# Patient Record
Sex: FEMALE | Race: ASIAN | NOT HISPANIC OR LATINO | Employment: FULL TIME | URBAN - METROPOLITAN AREA
[De-identification: names, ages, dates, MRNs, and addresses within clinical notes are randomized per-mention and may not be internally consistent; named-entity substitution may affect disease eponyms.]

---

## 2021-03-20 ENCOUNTER — IMMUNIZATION (OUTPATIENT)
Dept: PRIMARY CARE CLINIC | Facility: CLINIC | Age: 56
End: 2021-03-20

## 2021-03-20 DIAGNOSIS — Z23 NEED FOR VACCINATION: Primary | ICD-10-CM

## 2021-03-20 PROCEDURE — 0001A PR IMMUNIZ ADMIN, SARS-COV-2 COVID-19 VACC, 30MCG/0.3ML, 1ST DOSE: ICD-10-PCS | Mod: CV19,S$GLB,, | Performed by: INTERNAL MEDICINE

## 2021-03-20 PROCEDURE — 0001A PR IMMUNIZ ADMIN, SARS-COV-2 COVID-19 VACC, 30MCG/0.3ML, 1ST DOSE: CPT | Mod: CV19,S$GLB,, | Performed by: INTERNAL MEDICINE

## 2021-03-20 PROCEDURE — 91300 PR SARS-COV- 2 COVID-19 VACCINE, NO PRSV, 30MCG/0.3ML, IM: CPT | Mod: S$GLB,,, | Performed by: INTERNAL MEDICINE

## 2021-03-20 PROCEDURE — 91300 PR SARS-COV- 2 COVID-19 VACCINE, NO PRSV, 30MCG/0.3ML, IM: ICD-10-PCS | Mod: S$GLB,,, | Performed by: INTERNAL MEDICINE

## 2021-03-20 RX ADMIN — Medication 0.3 ML: at 01:03

## 2021-04-11 ENCOUNTER — IMMUNIZATION (OUTPATIENT)
Dept: PRIMARY CARE CLINIC | Facility: CLINIC | Age: 56
End: 2021-04-11

## 2021-04-11 DIAGNOSIS — Z23 NEED FOR VACCINATION: Primary | ICD-10-CM

## 2021-04-11 PROCEDURE — 0002A PR IMMUNIZ ADMIN, SARS-COV-2 COVID-19 VACC, 30MCG/0.3ML, 2ND DOSE: ICD-10-PCS | Mod: CV19,S$GLB,, | Performed by: INTERNAL MEDICINE

## 2021-04-11 PROCEDURE — 0002A PR IMMUNIZ ADMIN, SARS-COV-2 COVID-19 VACC, 30MCG/0.3ML, 2ND DOSE: CPT | Mod: CV19,S$GLB,, | Performed by: INTERNAL MEDICINE

## 2021-04-11 PROCEDURE — 91300 PR SARS-COV- 2 COVID-19 VACCINE, NO PRSV, 30MCG/0.3ML, IM: CPT | Mod: S$GLB,,, | Performed by: INTERNAL MEDICINE

## 2021-04-11 PROCEDURE — 91300 PR SARS-COV- 2 COVID-19 VACCINE, NO PRSV, 30MCG/0.3ML, IM: ICD-10-PCS | Mod: S$GLB,,, | Performed by: INTERNAL MEDICINE

## 2021-04-11 RX ADMIN — Medication 0.3 ML: at 10:04

## 2021-10-30 ENCOUNTER — IMMUNIZATION (OUTPATIENT)
Dept: PRIMARY CARE CLINIC | Facility: CLINIC | Age: 56
End: 2021-10-30

## 2021-10-30 DIAGNOSIS — Z23 NEED FOR VACCINATION: Primary | ICD-10-CM

## 2021-10-30 PROCEDURE — 0001A COVID-19, MRNA, LNP-S, PF, 30 MCG/0.3 ML DOSE VACCINE: CPT | Mod: CV19,PBBFAC

## 2022-09-08 ENCOUNTER — OFFICE VISIT (OUTPATIENT)
Dept: URGENT CARE | Facility: CLINIC | Age: 57
End: 2022-09-08

## 2022-09-08 VITALS
TEMPERATURE: 99 F | HEIGHT: 63 IN | OXYGEN SATURATION: 98 % | BODY MASS INDEX: 21.26 KG/M2 | SYSTOLIC BLOOD PRESSURE: 133 MMHG | WEIGHT: 120 LBS | DIASTOLIC BLOOD PRESSURE: 83 MMHG | RESPIRATION RATE: 16 BRPM | HEART RATE: 118 BPM

## 2022-09-08 DIAGNOSIS — J02.9 SORE THROAT: Primary | ICD-10-CM

## 2022-09-08 DIAGNOSIS — Z86.39 HX OF THYROID DISEASE: ICD-10-CM

## 2022-09-08 LAB
CTP QC/QA: YES
FLUAV AG NPH QL: NEGATIVE
FLUBV AG NPH QL: NEGATIVE
HETEROPH AB SER QL: NEGATIVE
MOLECULAR STREP A: NEGATIVE
SARS-COV-2 RDRP RESP QL NAA+PROBE: NEGATIVE

## 2022-09-08 PROCEDURE — 87804 INFLUENZA ASSAY W/OPTIC: CPT | Mod: 59,QW,TIER,S$GLB | Performed by: FAMILY MEDICINE

## 2022-09-08 PROCEDURE — 99203 OFFICE O/P NEW LOW 30 MIN: CPT | Mod: TIER,25,S$GLB, | Performed by: FAMILY MEDICINE

## 2022-09-08 PROCEDURE — 86308 POCT INFECTIOUS MONONUCLEOSIS: ICD-10-PCS | Mod: QW,TIER,S$GLB, | Performed by: FAMILY MEDICINE

## 2022-09-08 PROCEDURE — 99203 PR OFFICE/OUTPT VISIT, NEW, LEVL III, 30-44 MIN: ICD-10-PCS | Mod: TIER,25,S$GLB, | Performed by: FAMILY MEDICINE

## 2022-09-08 PROCEDURE — U0002 COVID-19 LAB TEST NON-CDC: HCPCS | Mod: QW,TIER,S$GLB, | Performed by: FAMILY MEDICINE

## 2022-09-08 PROCEDURE — U0002: ICD-10-PCS | Mod: QW,TIER,S$GLB, | Performed by: FAMILY MEDICINE

## 2022-09-08 PROCEDURE — 86308 HETEROPHILE ANTIBODY SCREEN: CPT | Mod: QW,TIER,S$GLB, | Performed by: FAMILY MEDICINE

## 2022-09-08 PROCEDURE — 87651 POCT STREP A MOLECULAR: ICD-10-PCS | Mod: QW,TIER,S$GLB, | Performed by: FAMILY MEDICINE

## 2022-09-08 PROCEDURE — 87804 POCT INFLUENZA A/B: ICD-10-PCS | Mod: 59,QW,TIER,S$GLB | Performed by: FAMILY MEDICINE

## 2022-09-08 PROCEDURE — 87651 STREP A DNA AMP PROBE: CPT | Mod: QW,TIER,S$GLB, | Performed by: FAMILY MEDICINE

## 2022-09-08 RX ORDER — FERROUS SULFATE, DRIED 160(50) MG
1 TABLET, EXTENDED RELEASE ORAL 2 TIMES DAILY WITH MEALS
COMMUNITY

## 2022-09-08 RX ORDER — MAGNESIUM 30 MG
TABLET ORAL ONCE
COMMUNITY

## 2022-09-08 RX ORDER — AMOXICILLIN 500 MG
CAPSULE ORAL DAILY
COMMUNITY

## 2022-09-08 NOTE — PATIENT INSTRUCTIONS
[facility]    [address] [place] [address], [address]  CHRISTINE Levi [address]  [phone] Phone    [phone] Fax    [facility]    Hours of Operation    Monday-Thursday: 8 a.m. to 5 p.m.  Friday: 8 a.m. to 1 p.m.  Saturday-Sunday: Closed  Services    Pediatrics, General Medicine, Medicaid Enrollment, Medicare Enrollment Assistance

## 2022-09-08 NOTE — PROGRESS NOTES
"Subjective:       Patient ID: Jennifer Castillo is a 57 y.o. female.    Vitals:  height is 5' 3" (1.6 m) and weight is 54.4 kg (120 lb). Her oral temperature is 98.6 °F (37 °C). Her blood pressure is 133/83 and her pulse is 118 (abnormal). Her respiration is 16 and oxygen saturation is 98%.     Chief Complaint: Sore Throat (Not covid. But have flu symptoms for at least 2 weeks. Bad sore throat. - Entered by patient)    This is a 57 y.o. female who presents today with a chief complaint of sore throat, cough x 2 weeks; temperature of 100.0 at home; 7 days ago had a temperature of 104 that was relieved with Nyquil. Pt has been using Dayquil and Nyquil with relief. Pt has taken 4 at-home COVID tests in the past 2 weeks which indicate negative. Reports hx of thyroid "infection" per patient in [place] and inquiring as to possibility of etiology      Sore Throat   This is a new problem. The current episode started 1 to 4 weeks ago. The problem has been waxing and waning. The pain is at a severity of 4/10. The pain is moderate. Associated symptoms include coughing and headaches. Pertinent negatives include no abdominal pain or congestion. She has had no exposure to strep or mono.     HENT:  Positive for sore throat. Negative for congestion.    Respiratory:  Positive for cough.    Gastrointestinal:  Negative for abdominal pain.   Neurological:  Positive for headaches.     Objective:      Physical Exam   Constitutional: normal  HENT:   Head: Normocephalic and atraumatic.   Nose: Nose normal.   Mouth/Throat: Mucous membranes are moist. Posterior oropharyngeal erythema present. No oropharyngeal exudate.   Eyes: Pupils are equal, round, and reactive to light. Extraocular movement intact   Cardiovascular: Normal rate, regular rhythm, normal heart sounds and normal pulses.   Abdominal: Normal appearance.   Lymphadenopathy:     She has cervical adenopathy (tender).   Neurological: She is alert.   Nursing note and vitals " reviewed.      Results for orders placed or performed in visit on 09/08/22   POCT COVID-19 Rapid Screening   Result Value Ref Range    POC Rapid COVID Negative Negative     Acceptable Yes    POCT Influenza A/B   Result Value Ref Range    Rapid Influenza A Ag Negative Negative    Rapid Influenza B Ag Negative Negative     Acceptable Yes    POCT Strep A, Molecular   Result Value Ref Range    Molecular Strep A, POC Negative Negative     Acceptable Yes    POCT Infectious mononucleosis antibody   Result Value Ref Range    Monospot Negative Negative     Acceptable Yes       Assessment:       1. Sore throat    2. Hx of thyroid disease          Plan:         Sore throat  -     POCT COVID-19 Rapid Screening  -     POCT Influenza A/B  -     POCT Strep A, Molecular  -     POCT Infectious mononucleosis antibody  -     diphenhydrAMINE-aluminum-magnesium hydroxide-simethicone-LIDOcaine HCl 2%; Swish and spit 10 mLs every 4 (four) hours as needed (sore throat).  Dispense: 120 each; Refill: 0    Hx of thyroid disease     Referred to primary care for re-evaluation. Information given

## 2022-09-13 ENCOUNTER — TELEPHONE (OUTPATIENT)
Dept: INTERNAL MEDICINE | Facility: CLINIC | Age: 57
End: 2022-09-13

## 2022-09-13 ENCOUNTER — OFFICE VISIT (OUTPATIENT)
Dept: INTERNAL MEDICINE | Facility: CLINIC | Age: 57
End: 2022-09-13

## 2022-09-13 VITALS
BODY MASS INDEX: 20.98 KG/M2 | HEIGHT: 63 IN | OXYGEN SATURATION: 99 % | DIASTOLIC BLOOD PRESSURE: 82 MMHG | HEART RATE: 129 BPM | WEIGHT: 118.38 LBS | SYSTOLIC BLOOD PRESSURE: 130 MMHG

## 2022-09-13 DIAGNOSIS — E05.90 HYPERTHYROIDISM: Primary | ICD-10-CM

## 2022-09-13 DIAGNOSIS — D50.9 IRON DEFICIENCY ANEMIA, UNSPECIFIED IRON DEFICIENCY ANEMIA TYPE: ICD-10-CM

## 2022-09-13 DIAGNOSIS — E07.9 THYROID DISEASE: ICD-10-CM

## 2022-09-13 DIAGNOSIS — R00.0 TACHYCARDIA: ICD-10-CM

## 2022-09-13 DIAGNOSIS — E05.90 HYPERTHYROIDISM: ICD-10-CM

## 2022-09-13 PROCEDURE — 93010 ELECTROCARDIOGRAM REPORT: CPT | Mod: S$PBB,,, | Performed by: INTERNAL MEDICINE

## 2022-09-13 PROCEDURE — 99204 PR OFFICE/OUTPT VISIT, NEW, LEVL IV, 45-59 MIN: ICD-10-PCS | Mod: S$PBB,,, | Performed by: NURSE PRACTITIONER

## 2022-09-13 PROCEDURE — 99999 PR PBB SHADOW E&M-EST. PATIENT-LVL IV: CPT | Mod: PBBFAC,,, | Performed by: NURSE PRACTITIONER

## 2022-09-13 PROCEDURE — 99214 OFFICE O/P EST MOD 30 MIN: CPT | Mod: PBBFAC | Performed by: NURSE PRACTITIONER

## 2022-09-13 PROCEDURE — 99204 OFFICE O/P NEW MOD 45 MIN: CPT | Mod: S$PBB,,, | Performed by: NURSE PRACTITIONER

## 2022-09-13 PROCEDURE — 93010 EKG 12-LEAD: ICD-10-PCS | Mod: S$PBB,,, | Performed by: INTERNAL MEDICINE

## 2022-09-13 PROCEDURE — 93005 ELECTROCARDIOGRAM TRACING: CPT | Mod: PBBFAC | Performed by: INTERNAL MEDICINE

## 2022-09-13 PROCEDURE — 99999 PR PBB SHADOW E&M-EST. PATIENT-LVL IV: ICD-10-PCS | Mod: PBBFAC,,, | Performed by: NURSE PRACTITIONER

## 2022-09-13 RX ORDER — METHIMAZOLE 10 MG/1
10 TABLET ORAL 2 TIMES DAILY
Qty: 60 TABLET | Refills: 11 | Status: SHIPPED | OUTPATIENT
Start: 2022-09-13 | End: 2022-11-24 | Stop reason: ALTCHOICE

## 2022-09-13 RX ORDER — METHIMAZOLE 5 MG/1
5 TABLET ORAL 3 TIMES DAILY
Qty: 90 TABLET | Refills: 11 | Status: SHIPPED | OUTPATIENT
Start: 2022-09-13 | End: 2022-09-13 | Stop reason: SDUPTHER

## 2022-09-13 RX ORDER — PROPRANOLOL HYDROCHLORIDE 20 MG/1
20 TABLET ORAL 3 TIMES DAILY
Qty: 90 TABLET | Refills: 11 | Status: SHIPPED | OUTPATIENT
Start: 2022-09-13 | End: 2023-09-13

## 2022-09-13 NOTE — TELEPHONE ENCOUNTER
----- Message from Dannie Saleem MD sent at 9/13/2022  2:30 PM CDT -----  Oh yes I see now.  FT4 is significantly elevated.  I would recommend Methimazole 10mg BID for now until we can get her in to see us.       Thank you   ----- Message -----  From: Maxine Fletcher NP  Sent: 9/13/2022   1:45 PM CDT  To: Dannie Saleem MD    She had labs done at [facility], I wrote the values in my HPI   ----- Message -----  From: Dannie Saleem MD  Sent: 9/13/2022   1:35 PM CDT  To: Maxine Fletcher NP, #    Good afternoon Ms. Baca,     Thank you for reaching out.  I do not see any thyroid function testing recent.  Methimazole can be given BID for better compliance.  Depends on the FT4/ T3 and symptoms to determine dose.  For now Methimazole 5mg TID and Propranolol should be ok if indeed his TFTs are consistent with hyperthyroidism.  DO you know what was his TSH, FT4 and T3 levels?       We will schedule a new patient visit the earliest available.       Dairyal,     Can we please schedule New patient visit with me or any available provider.       Thank you       ----- Message -----  From: Maxine Fletcher NP  Sent: 9/13/2022   1:28 PM CDT  To: Dannie Saleem MD    Good afternoon,   I saw Ms Castillo in clinic today and she is recently dx with hyperthyroidism. I started her on methimazole 5mg tid and propranolol 20mg TID. She is scheduled for thyroid u/s tomorrow. Any other advice would be greatly appreciated. I dont usually treat this in primary care but with her recent symptoms and without insurance I was trying to keep her out of the ED   Maxine

## 2022-09-13 NOTE — PROGRESS NOTES
Internal Medicine Annual Exam       CHIEF COMPLAINT     The patient, Jennifer Castillo, who is a 57 y.o. female presents for an annual exam.    HPI     Per pt and daughter - pt is uninsured but has labs from [facility] from 9/2021 and 9/2022    Here with c/o illness starting 3 weeks ago. Started with fatigue and sore throat. Daughter was ill the week before. Since that time pt started with fever 1 week into illness - Tmax 104 resolved with tylenol. Was treating with Nyquil. Felt better but symptoms returned.   +cough  +night sweats   +chills.   Continues to have low grade fevers and sore throat and radiating up to the right ear and headaches and body aches   +throat feels swollen has moved from right to left     TSH 0.01L  T4 free 8.1 H  T4 total 18.3 H  T3 uptake 44H    HM-  [facility]- 10/2021  [facility]- needs   PAP-3/2022      Past Medical History:  Past Medical History:   Diagnosis Date    Thyroid disease        History reviewed. No pertinent surgical history.     Family History   Problem Relation Age of Onset    Stomach cancer Mother     Hypertension Father     No Known Problems Brother     No Known Problems Brother     No Known Problems Brother     Liver cancer Maternal Aunt         Social History     Socioeconomic History    Marital status:    Tobacco Use    Smoking status: Never     Passive exposure: Never    Smokeless tobacco: Never   Substance and Sexual Activity    Alcohol use: Yes     Alcohol/week: 1.0 standard drink     Types: 1 Cans of beer per week     Comment: socailly    Drug use: Never    Sexual activity: Not Currently     Partners: Male     Birth control/protection: Post-menopausal        Social History     Tobacco Use   Smoking Status Never    Passive exposure: Never   Smokeless Tobacco Never        Allergies as of 09/13/2022    (No Known Allergies)          Home Medications:  Prior to Admission medications    Medication Sig Start Date End Date Taking? Authorizing Provider   calcium-vitamin D3 (OS-VERNA 500 + D3)  500 mg-5 mcg (200 unit) per tablet Take 1 tablet by mouth 2 (two) times daily with meals.    Historical Provider   diphenhydrAMINE-aluminum-magnesium hydroxide-simethicone-LIDOcaine HCl 2% Swish and spit 10 mLs every 4 (four) hours as needed (sore throat).  Patient not taking: Reported on 9/13/2022 9/8/22   Leonard Gagnon MD   magnesium 30 mg Tab Take by mouth once.    Historical Provider   omega-3 fatty acids/fish oil (FISH OIL-OMEGA-3 FATTY ACIDS) 300-1,000 mg capsule Take by mouth once daily.    Historical Provider       Review of Systems:  Review of Systems   Constitutional:  Positive for chills, diaphoresis, fatigue and fever (last fever 1 week ago - has normal temperature in office without medication).   HENT:  Positive for ear pain and sore throat. Negative for congestion.    Respiratory:  Positive for cough and chest tightness. Negative for shortness of breath and wheezing.    Cardiovascular:  Negative for chest pain and palpitations.   Gastrointestinal:  Negative for abdominal pain, constipation, diarrhea, nausea and vomiting (when using magic mouthwash).   Endocrine: Positive for cold intolerance and heat intolerance.   Genitourinary: Negative.    Musculoskeletal:  Positive for arthralgias and myalgias.   Skin:  Negative for rash.   Neurological:  Negative for dizziness, light-headedness and headaches.     Health Maintainence:   Immunizations:  Health Maintenance         Date Due Completion Date    Hepatitis C Screening Never done ---    Cervical Cancer Screening Never done ---    Lipid Panel Never done ---    HIV Screening Never done ---    TETANUS VACCINE Never done ---    Mammogram Never done ---    Colorectal Cancer Screening Never done ---    Shingles Vaccine (1 of 2) Never done ---    COVID-19 Vaccine (4 - Booster for Pfizer series) 02/28/2022 10/30/2021    Influenza Vaccine (1) 09/01/2022 10/26/2021             PHYSICAL EXAM     /82 (BP Location: Left arm, Patient Position: Sitting, BP  "Method: Medium (Manual))   Pulse (!) 129   Ht 5' 3" (1.6 m)   Wt 53.7 kg (118 lb 6.2 oz)   LMP  (LMP Unknown)   SpO2 99%   BMI 20.97 kg/m²  Body mass index is 20.97 kg/m².    Physical Exam  Vitals reviewed.   Constitutional:       Appearance: She is well-developed.   HENT:      Head: Normocephalic.      Right Ear: External ear normal.      Left Ear: External ear normal.      Nose: Nose normal.      Mouth/Throat:      Pharynx: No oropharyngeal exudate.   Eyes:      Pupils: Pupils are equal, round, and reactive to light.   Neck:      Thyroid: Thyromegaly present.      Vascular: No JVD.      Trachea: No tracheal deviation.   Cardiovascular:      Rate and Rhythm: Regular rhythm. Tachycardia present.      Heart sounds: Normal heart sounds. No murmur heard.    No friction rub. No gallop.   Pulmonary:      Effort: Pulmonary effort is normal. No respiratory distress.      Breath sounds: Normal breath sounds. No wheezing or rales.   Abdominal:      General: Bowel sounds are normal. There is no distension.      Palpations: Abdomen is soft.      Tenderness: There is no abdominal tenderness.   Musculoskeletal:         General: No tenderness. Normal range of motion.      Cervical back: Neck supple.   Lymphadenopathy:      Cervical: No cervical adenopathy.   Skin:     General: Skin is warm and dry.      Findings: No rash.   Neurological:      Mental Status: She is alert and oriented to person, place, and time.   Psychiatric:         Behavior: Behavior normal.       LABS     No results found for: LABA1C, HGBA1C  CMP  No results found for: NA, K, CL, CO2, GLU, BUN, CREATININE, CALCIUM, PROT, ALBUMIN, BILITOT, ALKPHOS, AST, ALT, ANIONGAP, ESTGFRAFRICA, EGFRNONAA  No results found for: WBC, HGB, HCT, MCV, PLT  No results found for: CHOL  No results found for: HDL  No results found for: LDLCALC  No results found for: TRIG  No results found for: CHOLHDL  No results found for: TSH, E6TADHO, [id number], THYROIDAB    ASSESSMENT/PLAN "     Jennifer Castillo is a 57 y.o. female    Hyperthyroidism- new onset. reviewed recent labs. Will start methimazole and propranolol. EKG in office ST rate 112. Will send for STAT endo referral and thyroid u/s. Discussed ED precautions with daughter who states understanding. Will f/u in 2 weeks with me if unable to get in with endo.   -     IN OFFICE EKG 12-LEAD (to Muse)  -     US Soft Tissue Head Neck Thyroid; Future; Expected date: 09/13/2022  -     propranoloL (INDERAL) 20 MG tablet; Take 1 tablet (20 mg total) by mouth 3 (three) times daily.  Dispense: 90 tablet; Refill: 11  -     methIMAzole (TAPAZOLE) 5 MG Tab; Take 1 tablet (5 mg total) by mouth 3 (three) times daily.  Dispense: 90 tablet; Refill: 11  -     Ambulatory referral/consult to Endocrinology; Future; Expected date: 09/20/2022    Thyroid disease- new onset. reviewed recent labs. Will start methimazole and propranolol. EKG in office ST rate 112. Will send for STAT endo referral and thyroid u/s. Discussed ED precautions with daughter who states understanding. Will f/u in 2 weeks with me if unable to get in with endo.   -     IN OFFICE EKG 12-LEAD (to Muse)  -     US Soft Tissue Head Neck Thyroid; Future; Expected date: 09/13/2022  -     propranoloL (INDERAL) 20 MG tablet; Take 1 tablet (20 mg total) by mouth 3 (three) times daily.  Dispense: 90 tablet; Refill: 11  -     methIMAzole (TAPAZOLE) 5 MG Tab; Take 1 tablet (5 mg total) by mouth 3 (three) times daily.  Dispense: 90 tablet; Refill: 11  -     Ambulatory referral/consult to Endocrinology; Future; Expected date: 09/20/2022    Iron deficiency anemia, unspecified iron deficiency anemia type- will check stool sample. If positive send for c-scope.   -     Occult blood x 1, stool; Future; Expected date: 09/13/2022  -     IN OFFICE EKG 12-LEAD (to Muse)  -     US Soft Tissue Head Neck Thyroid; Future; Expected date: 09/13/2022  -     propranoloL (INDERAL) 20 MG tablet; Take 1 tablet (20 mg total) by mouth 3  (three) times daily.  Dispense: 90 tablet; Refill: 11  -     methIMAzole (TAPAZOLE) 5 MG Tab; Take 1 tablet (5 mg total) by mouth 3 (three) times daily.  Dispense: 90 tablet; Refill: 11    Tachycardia- new onset. reviewed recent labs. Will start methimazole and propranolol. EKG in office ST rate 112. Will send for STAT endo referral and thyroid u/s. Discussed ED precautions with daughter who states understanding. Will f/u in 2 weeks with me if unable to get in with endo.   -     IN OFFICE EKG 12-LEAD (to Muse)  -     US Soft Tissue Head Neck Thyroid; Future; Expected date: 09/13/2022  -     propranoloL (INDERAL) 20 MG tablet; Take 1 tablet (20 mg total) by mouth 3 (three) times daily.  Dispense: 90 tablet; Refill: 11  -     methIMAzole (TAPAZOLE) 5 MG Tab; Take 1 tablet (5 mg total) by mouth 3 (three) times daily.  Dispense: 90 tablet; Refill: 11  -     Ambulatory referral/consult to Endocrinology; Future; Expected date: 09/20/2022     Follow up with PCP in 2 weeks for follow up     Maxine CLANCY, APRN, FNP-c   Department of Internal Medicine - [facility]  12:04 PM

## 2022-09-13 NOTE — Clinical Note
Good afternoon,  I saw Ms Castillo in clinic today and she is recently dx with hyperthyroidism. I started her on methimazole 5mg tid and propranolol 20mg TID. She is scheduled for thyroid u/s tomorrow. Any other advice would be greatly appreciated. I dont usually treat this in primary care but with her recent symptoms and without insurance I was trying to keep her out of the ED  Maxine

## 2022-09-14 ENCOUNTER — HOSPITAL ENCOUNTER (OUTPATIENT)
Dept: RADIOLOGY | Facility: HOSPITAL | Age: 57
Discharge: HOME | End: 2022-09-14
Attending: NURSE PRACTITIONER

## 2022-09-14 DIAGNOSIS — R00.0 TACHYCARDIA: ICD-10-CM

## 2022-09-14 DIAGNOSIS — E07.9 THYROID DISEASE: ICD-10-CM

## 2022-09-14 DIAGNOSIS — D50.9 IRON DEFICIENCY ANEMIA, UNSPECIFIED IRON DEFICIENCY ANEMIA TYPE: ICD-10-CM

## 2022-09-14 DIAGNOSIS — E05.90 HYPERTHYROIDISM: ICD-10-CM

## 2022-09-14 PROCEDURE — 76536 US EXAM OF HEAD AND NECK: CPT | Mod: TC

## 2022-09-14 PROCEDURE — 76536 US SOFT TISSUE HEAD NECK THYROID: ICD-10-PCS | Mod: 26,,, | Performed by: RADIOLOGY

## 2022-09-14 PROCEDURE — 76536 US EXAM OF HEAD AND NECK: CPT | Mod: 26,,, | Performed by: RADIOLOGY

## 2022-09-19 ENCOUNTER — LAB VISIT (OUTPATIENT)
Dept: LAB | Facility: HOSPITAL | Age: 57
End: 2022-09-19
Attending: GENERAL ACUTE CARE HOSPITAL

## 2022-09-19 ENCOUNTER — OFFICE VISIT (OUTPATIENT)
Dept: ENDOCRINOLOGY | Facility: CLINIC | Age: 57
End: 2022-09-19

## 2022-09-19 ENCOUNTER — PATIENT MESSAGE (OUTPATIENT)
Dept: ENDOCRINOLOGY | Facility: CLINIC | Age: 57
End: 2022-09-19

## 2022-09-19 VITALS
WEIGHT: 114 LBS | SYSTOLIC BLOOD PRESSURE: 110 MMHG | BODY MASS INDEX: 20.2 KG/M2 | DIASTOLIC BLOOD PRESSURE: 60 MMHG | HEART RATE: 97 BPM | OXYGEN SATURATION: 93 % | HEIGHT: 63 IN

## 2022-09-19 DIAGNOSIS — E05.90 HYPERTHYROIDISM: ICD-10-CM

## 2022-09-19 DIAGNOSIS — K21.9 GASTROESOPHAGEAL REFLUX DISEASE, UNSPECIFIED WHETHER ESOPHAGITIS PRESENT: ICD-10-CM

## 2022-09-19 DIAGNOSIS — E07.9 THYROID DISEASE: ICD-10-CM

## 2022-09-19 DIAGNOSIS — R00.0 TACHYCARDIA: ICD-10-CM

## 2022-09-19 DIAGNOSIS — E05.90 HYPERTHYROIDISM: Primary | ICD-10-CM

## 2022-09-19 LAB — CRP SERPL-MCNC: 84.4 MG/L (ref 0–8.2)

## 2022-09-19 PROCEDURE — 86140 C-REACTIVE PROTEIN: CPT | Performed by: GENERAL ACUTE CARE HOSPITAL

## 2022-09-19 PROCEDURE — 36415 COLL VENOUS BLD VENIPUNCTURE: CPT | Performed by: GENERAL ACUTE CARE HOSPITAL

## 2022-09-19 PROCEDURE — 99999 PR PBB SHADOW E&M-EST. PATIENT-LVL V: ICD-10-PCS | Mod: PBBFAC,,, | Performed by: GENERAL ACUTE CARE HOSPITAL

## 2022-09-19 PROCEDURE — 99999 PR PBB SHADOW E&M-EST. PATIENT-LVL V: CPT | Mod: PBBFAC,,, | Performed by: GENERAL ACUTE CARE HOSPITAL

## 2022-09-19 PROCEDURE — 99215 OFFICE O/P EST HI 40 MIN: CPT | Mod: PBBFAC | Performed by: GENERAL ACUTE CARE HOSPITAL

## 2022-09-19 PROCEDURE — 99204 OFFICE O/P NEW MOD 45 MIN: CPT | Mod: S$PBB,,, | Performed by: GENERAL ACUTE CARE HOSPITAL

## 2022-09-19 PROCEDURE — 99204 PR OFFICE/OUTPT VISIT, NEW, LEVL IV, 45-59 MIN: ICD-10-PCS | Mod: S$PBB,,, | Performed by: GENERAL ACUTE CARE HOSPITAL

## 2022-09-19 RX ORDER — PANTOPRAZOLE SODIUM 20 MG/1
20 TABLET, DELAYED RELEASE ORAL DAILY
Qty: 30 TABLET | Refills: 1 | Status: SHIPPED | OUTPATIENT
Start: 2022-09-19 | End: 2023-09-19

## 2022-09-19 NOTE — PATIENT INSTRUCTIONS
Due to your thyroid being over active which is very likely to be inflammation of your thyroid from prior viral illness, I will recommend the following.       We will do blood work today to investigate why your thyroid is over active.     For now continue taking Methimazole 10mg twice a day.     For now continue with  Propranolol 20mg three times a day (as needed for palpitations or tremors)     You can start taking Ibuprofen 400mg every 8 hrs for pain.         You can start taking pantoprazole 20mg daily for acid reflux.      Once I have results of the blood work, I will contact you with next steps to follow.     Follow up in 4 months.     Dr. Berg

## 2022-09-19 NOTE — PROGRESS NOTES
Subjective:      Patient ID: Jennifer Castillo is a 57 y.o.    Chief Complaint:  Hyperthyroidism; Initial visit     There is no problem list on file for this patient.     History of Present Illness  58 YO Male that presents to the endocrine clinic for initial evaluation of Hyperthyroidism. Pt presented to PCP on 9/13/2022 c/o fever sore throat and neck pain.  During evaluation patient had Low TSH with elevated FT4 and patient was started on methimazole and propranolol.  Pt today reports that she was illl with sore throat, fatigue (1 month ago x 1 week).   On week 2 she started having fevers x 1 week, night  sweats, worsening sore throat and having neck swelling and tenderness.  Also having palpitations, diarrhea, heat intolerance, tremors and weight loss.  Denies eye symptoms or thyroid compressive symptoms.  Denies FH of Thyroid disease.         With regards to Hyperthyroidism and Thyroid Nodule:       Out side labs not available for my review.  Per ref provider note:        TSH 0.01L  T4 free 8.1 H  T4 total 18.3 H  T3 uptake 44H      Pt  was started on Methimazole 10mg BID and propranolol 20mg TID      Prior to initiation of treatment patient was having Fever, Cough, Sore Throat and neck pain  (Concerning for sub acute thyroiditis)       Thyroid US 9/2022:    FINDINGS:  Right thyroid lobe measures 5.3 x 1.5 x 1.7 cm.  Diffusely heterogeneous parenchyma.  There is a solid, hypoechoic nodule measuring 2.0 cm, TR 4.     Left thyroid lobe measures 6.3 x 2.1 x 2.1 cm.  Diffusely heterogeneous parenchyma.     Isthmus measures 0.4 cm.     There is diffuse hyperemia.     No evidence for cervical lymphadenopathy.     Impression:     1. Diffusely heterogeneous thyroid parenchyma with hyperemia, which may be seen with thyroiditis.  2. Dominant right thyroid nodule meets criteria for fine-needle aspiration.          -Symptoms?   Tremors, palpitations,  Neck swelling,  Neck tenderness, Weight loss, heat intolerance, decreased  appetite, anxiety, Diarrhea.   -CAD, Arrhythmias?  DENIES   -Eye disease?  DENIES   -Recent IV contrast or Steroids?  DENIES   -Medications?  Amiodarone,  Lasix,  Lt4, Steroids ?   DENIES         -Family history of Thyroid disease?  DENIES     -Compressive symptoms?  DENIES     -Radiation? NO     -Family history of Thyroid CA?  NO    ROS:   As above    Objective:     LMP  (LMP Unknown)     There is no height or weight on file to calculate BMI.      Physical Exam  Vitals reviewed.   Constitutional:       General: She is not in acute distress.     Appearance: Normal appearance. She is well-developed. She is not ill-appearing.   HENT:      Nose: Nose normal. No rhinorrhea.      Mouth/Throat:      Mouth: Mucous membranes are moist.   Eyes:      Extraocular Movements: Extraocular movements intact.      Pupils: Pupils are equal, round, and reactive to light.      Comments: No proptosis, No lid lag, No conjunctival erythema    Neck:      Thyroid: No thyromegaly.      Trachea: No tracheal deviation.      Comments: Thyromegaly +,  Neck tenderness   Cardiovascular:      Rate and Rhythm: Normal rate and regular rhythm.      Pulses: Normal pulses.   Pulmonary:      Effort: Pulmonary effort is normal.      Breath sounds: Normal breath sounds.   Abdominal:      Palpations: Abdomen is soft. There is no mass.      Tenderness: There is no abdominal tenderness.      Hernia: No hernia is present.   Musculoskeletal:         General: No swelling.      Cervical back: Neck supple. No tenderness.      Right lower leg: No edema.      Left lower leg: No edema.   Skin:     General: Skin is warm.      Findings: No rash.   Neurological:      General: No focal deficit present.      Mental Status: She is alert and oriented to person, place, and time.   Psychiatric:         Mood and Affect: Mood normal.         Judgment: Judgment normal.              Lab Review:   No results found for: HGBA1C  No results found for: CHOL, HDL, LDLCALC, TRIG,  CHOLHDL  No results found for: NA, K, CL, CO2, GLU, BUN, CREATININE, CALCIUM, PROT, ALBUMIN, BILITOT, ALKPHOS, AST, ALT, ANIONGAP, ESTGFRAFRICA, EGFRNONAA, TSH  No results found for: [id number]    Assessment and Plan     Hyperthyroidism    Clinically and chemically Hyperthyroid     Likely Sub acute thyroiditis after Viral illness     C/w Methimazole 10mg BID for now     C/w Propranolol 20mg BID for now     TSH, TRAB, TSI, ESR, CRP today       Follow inflammatory markers, Rule out Sub acute thyroiditis   (IF subacute thyroiditis, then we would stop methimazole and treat with prednisone and BB)      If patient has Grave's will adjust methimazole pending on TFTs     Pending on results will determine therapy and need for Uptake and scan       Thyroid nodule    Left thyroid nodule meets criteria for FNA     Will treat hyperthyroidism and once chemically Euthyroid will proceed with FNA

## 2022-09-20 ENCOUNTER — PATIENT MESSAGE (OUTPATIENT)
Dept: ENDOCRINOLOGY | Facility: CLINIC | Age: 57
End: 2022-09-20

## 2022-09-20 ENCOUNTER — LAB VISIT (OUTPATIENT)
Dept: LAB | Facility: HOSPITAL | Age: 57
End: 2022-09-20
Attending: GENERAL ACUTE CARE HOSPITAL

## 2022-09-20 DIAGNOSIS — E05.90 HYPERTHYROIDISM: ICD-10-CM

## 2022-09-20 LAB
ALBUMIN SERPL BCP-MCNC: 2.9 G/DL (ref 3.5–5.2)
ALP SERPL-CCNC: 133 U/L (ref 55–135)
ALT SERPL W/O P-5'-P-CCNC: 52 U/L (ref 10–44)
ANION GAP SERPL CALC-SCNC: 11 MMOL/L (ref 8–16)
AST SERPL-CCNC: 23 U/L (ref 10–40)
BASOPHILS # BLD AUTO: 0.02 K/UL (ref 0–0.2)
BASOPHILS NFR BLD: 0.3 % (ref 0–1.9)
BILIRUB SERPL-MCNC: 0.9 MG/DL (ref 0.1–1)
BUN SERPL-MCNC: 10 MG/DL (ref 6–20)
CALCIUM SERPL-MCNC: 9.7 MG/DL (ref 8.7–10.5)
CHLORIDE SERPL-SCNC: 100 MMOL/L (ref 95–110)
CO2 SERPL-SCNC: 26 MMOL/L (ref 23–29)
CREAT SERPL-MCNC: 0.6 MG/DL (ref 0.5–1.4)
DIFFERENTIAL METHOD: ABNORMAL
EOSINOPHIL # BLD AUTO: 0 K/UL (ref 0–0.5)
EOSINOPHIL NFR BLD: 0.7 % (ref 0–8)
ERYTHROCYTE [DISTWIDTH] IN BLOOD BY AUTOMATED COUNT: 14.5 % (ref 11.5–14.5)
ERYTHROCYTE [SEDIMENTATION RATE] IN BLOOD BY PHOTOMETRIC METHOD: 74 MM/HR (ref 0–36)
EST. GFR  (NO RACE VARIABLE): >60 ML/MIN/1.73 M^2
GLUCOSE SERPL-MCNC: 161 MG/DL (ref 70–110)
HCT VFR BLD AUTO: 30.9 % (ref 37–48.5)
HGB BLD-MCNC: 10.3 G/DL (ref 12–16)
IMM GRANULOCYTES # BLD AUTO: 0.02 K/UL (ref 0–0.04)
IMM GRANULOCYTES NFR BLD AUTO: 0.3 % (ref 0–0.5)
LYMPHOCYTES # BLD AUTO: 1.6 K/UL (ref 1–4.8)
LYMPHOCYTES NFR BLD: 28.7 % (ref 18–48)
MCH RBC QN AUTO: 27.7 PG (ref 27–31)
MCHC RBC AUTO-ENTMCNC: 33.3 G/DL (ref 32–36)
MCV RBC AUTO: 83 FL (ref 82–98)
MONOCYTES # BLD AUTO: 0.5 K/UL (ref 0.3–1)
MONOCYTES NFR BLD: 8.4 % (ref 4–15)
NEUTROPHILS # BLD AUTO: 3.5 K/UL (ref 1.8–7.7)
NEUTROPHILS NFR BLD: 61.6 % (ref 38–73)
NRBC BLD-RTO: 0 /100 WBC
PLATELET # BLD AUTO: 262 K/UL (ref 150–450)
PMV BLD AUTO: 12.6 FL (ref 9.2–12.9)
POTASSIUM SERPL-SCNC: 3.4 MMOL/L (ref 3.5–5.1)
PROT SERPL-MCNC: 7.6 G/DL (ref 6–8.4)
RBC # BLD AUTO: 3.72 M/UL (ref 4–5.4)
SODIUM SERPL-SCNC: 137 MMOL/L (ref 136–145)
T3 SERPL-MCNC: 248 NG/DL (ref 60–180)
T4 FREE SERPL-MCNC: 3.26 NG/DL (ref 0.71–1.51)
TSH SERPL DL<=0.005 MIU/L-ACNC: <0.01 UIU/ML (ref 0.4–4)
WBC # BLD AUTO: 5.72 K/UL (ref 3.9–12.7)

## 2022-09-20 PROCEDURE — 84439 ASSAY OF FREE THYROXINE: CPT | Performed by: GENERAL ACUTE CARE HOSPITAL

## 2022-09-20 PROCEDURE — 84445 ASSAY OF TSI GLOBULIN: CPT | Performed by: GENERAL ACUTE CARE HOSPITAL

## 2022-09-20 PROCEDURE — 83520 IMMUNOASSAY QUANT NOS NONAB: CPT | Performed by: GENERAL ACUTE CARE HOSPITAL

## 2022-09-20 PROCEDURE — 80053 COMPREHEN METABOLIC PANEL: CPT | Performed by: GENERAL ACUTE CARE HOSPITAL

## 2022-09-20 PROCEDURE — 84480 ASSAY TRIIODOTHYRONINE (T3): CPT | Performed by: GENERAL ACUTE CARE HOSPITAL

## 2022-09-20 PROCEDURE — 85025 COMPLETE CBC W/AUTO DIFF WBC: CPT | Performed by: GENERAL ACUTE CARE HOSPITAL

## 2022-09-20 PROCEDURE — 84443 ASSAY THYROID STIM HORMONE: CPT | Performed by: GENERAL ACUTE CARE HOSPITAL

## 2022-09-20 PROCEDURE — 85652 RBC SED RATE AUTOMATED: CPT | Performed by: GENERAL ACUTE CARE HOSPITAL

## 2022-09-21 ENCOUNTER — PATIENT MESSAGE (OUTPATIENT)
Dept: ENDOCRINOLOGY | Facility: CLINIC | Age: 57
End: 2022-09-21

## 2022-09-21 LAB — TSH RECEP AB SER-ACNC: 1.22 IU/L (ref 0–1.75)

## 2022-09-23 ENCOUNTER — PATIENT MESSAGE (OUTPATIENT)
Dept: ENDOCRINOLOGY | Facility: CLINIC | Age: 57
End: 2022-09-23

## 2022-09-23 DIAGNOSIS — E05.90 HYPERTHYROIDISM: Primary | ICD-10-CM

## 2022-09-23 LAB — TSI SER-ACNC: <0.1 IU/L

## 2022-10-07 ENCOUNTER — PATIENT MESSAGE (OUTPATIENT)
Dept: ENDOCRINOLOGY | Facility: HOSPITAL | Age: 57
End: 2022-10-07

## 2022-10-07 ENCOUNTER — LAB VISIT (OUTPATIENT)
Dept: LAB | Facility: HOSPITAL | Age: 57
End: 2022-10-07
Attending: GENERAL ACUTE CARE HOSPITAL

## 2022-10-07 DIAGNOSIS — E06.9 THYROIDITIS: Primary | ICD-10-CM

## 2022-10-07 DIAGNOSIS — E05.90 HYPERTHYROIDISM: ICD-10-CM

## 2022-10-07 LAB
T4 FREE SERPL-MCNC: 0.73 NG/DL (ref 0.71–1.51)
TSH SERPL DL<=0.005 MIU/L-ACNC: 0.04 UIU/ML (ref 0.4–4)

## 2022-10-07 PROCEDURE — 36415 COLL VENOUS BLD VENIPUNCTURE: CPT | Performed by: GENERAL ACUTE CARE HOSPITAL

## 2022-10-07 PROCEDURE — 84439 ASSAY OF FREE THYROXINE: CPT | Performed by: GENERAL ACUTE CARE HOSPITAL

## 2022-10-07 PROCEDURE — 84443 ASSAY THYROID STIM HORMONE: CPT | Performed by: GENERAL ACUTE CARE HOSPITAL

## 2022-10-15 ENCOUNTER — IMMUNIZATION (OUTPATIENT)
Dept: PRIMARY CARE CLINIC | Facility: CLINIC | Age: 57
End: 2022-10-15

## 2022-10-15 DIAGNOSIS — Z23 NEED FOR VACCINATION: Primary | ICD-10-CM

## 2022-10-15 PROCEDURE — 91312 COVID-19, MRNA, LNP-S, BIVALENT BOOSTER, PF, 30 MCG/0.3 ML DOSE: CPT | Mod: PBBFAC | Performed by: INTERNAL MEDICINE

## 2022-10-15 PROCEDURE — 0124A COVID-19, MRNA, LNP-S, BIVALENT BOOSTER, PF, 30 MCG/0.3 ML DOSE: CPT | Mod: CV19,PBBFAC | Performed by: INTERNAL MEDICINE

## 2022-11-01 ENCOUNTER — PATIENT MESSAGE (OUTPATIENT)
Dept: ENDOCRINOLOGY | Facility: CLINIC | Age: 57
End: 2022-11-01

## 2022-11-01 ENCOUNTER — LAB VISIT (OUTPATIENT)
Dept: LAB | Facility: HOSPITAL | Age: 57
End: 2022-11-01
Attending: GENERAL ACUTE CARE HOSPITAL

## 2022-11-01 DIAGNOSIS — E06.9 THYROIDITIS: ICD-10-CM

## 2022-11-01 DIAGNOSIS — E06.9 THYROIDITIS: Primary | ICD-10-CM

## 2022-11-01 LAB
T4 FREE SERPL-MCNC: 0.58 NG/DL (ref 0.71–1.51)
TSH SERPL DL<=0.005 MIU/L-ACNC: 19.58 UIU/ML (ref 0.4–4)

## 2022-11-01 PROCEDURE — 84443 ASSAY THYROID STIM HORMONE: CPT | Performed by: GENERAL ACUTE CARE HOSPITAL

## 2022-11-01 PROCEDURE — 36415 COLL VENOUS BLD VENIPUNCTURE: CPT | Performed by: GENERAL ACUTE CARE HOSPITAL

## 2022-11-01 PROCEDURE — 84439 ASSAY OF FREE THYROXINE: CPT | Performed by: GENERAL ACUTE CARE HOSPITAL

## 2022-11-02 ENCOUNTER — PATIENT MESSAGE (OUTPATIENT)
Dept: ENDOCRINOLOGY | Facility: CLINIC | Age: 57
End: 2022-11-02

## 2022-11-02 NOTE — TELEPHONE ENCOUNTER
Subacute thyroiditis in Hypothyroid phase.     Clinically Euthyroid     Will hold off on starting LT4     Repeat TFTs in 2 weeks and treat accordingly

## 2022-11-23 ENCOUNTER — LAB VISIT (OUTPATIENT)
Dept: LAB | Facility: HOSPITAL | Age: 57
End: 2022-11-23
Attending: GENERAL ACUTE CARE HOSPITAL

## 2022-11-23 DIAGNOSIS — E06.9 THYROIDITIS: ICD-10-CM

## 2022-11-23 LAB
T4 FREE SERPL-MCNC: 0.76 NG/DL (ref 0.71–1.51)
TSH SERPL DL<=0.005 MIU/L-ACNC: 6.79 UIU/ML (ref 0.4–4)

## 2022-11-23 PROCEDURE — 84439 ASSAY OF FREE THYROXINE: CPT | Performed by: GENERAL ACUTE CARE HOSPITAL

## 2022-11-23 PROCEDURE — 84443 ASSAY THYROID STIM HORMONE: CPT | Performed by: GENERAL ACUTE CARE HOSPITAL

## 2022-11-23 PROCEDURE — 36415 COLL VENOUS BLD VENIPUNCTURE: CPT | Performed by: GENERAL ACUTE CARE HOSPITAL

## 2022-11-24 ENCOUNTER — TELEPHONE (OUTPATIENT)
Dept: ENDOCRINOLOGY | Facility: HOSPITAL | Age: 57
End: 2022-11-24

## 2022-11-24 DIAGNOSIS — E06.9 THYROIDITIS: Primary | ICD-10-CM

## 2022-11-24 NOTE — TELEPHONE ENCOUNTER
Subacute thyroiditis is resolution phase.  FT4 now detectable after hypothyroid phase.     Will continue to monitor.

## 2023-01-06 ENCOUNTER — LAB VISIT (OUTPATIENT)
Dept: LAB | Facility: HOSPITAL | Age: 58
End: 2023-01-06
Attending: GENERAL ACUTE CARE HOSPITAL

## 2023-01-06 DIAGNOSIS — E06.9 THYROIDITIS: ICD-10-CM

## 2023-01-06 LAB
T4 FREE SERPL-MCNC: 0.9 NG/DL (ref 0.71–1.51)
TSH SERPL DL<=0.005 MIU/L-ACNC: 6.93 UIU/ML (ref 0.4–4)

## 2023-01-06 PROCEDURE — 84439 ASSAY OF FREE THYROXINE: CPT | Performed by: GENERAL ACUTE CARE HOSPITAL

## 2023-01-06 PROCEDURE — 36415 COLL VENOUS BLD VENIPUNCTURE: CPT | Performed by: GENERAL ACUTE CARE HOSPITAL

## 2023-01-06 PROCEDURE — 84443 ASSAY THYROID STIM HORMONE: CPT | Performed by: GENERAL ACUTE CARE HOSPITAL

## 2023-01-11 ENCOUNTER — PATIENT MESSAGE (OUTPATIENT)
Dept: ENDOCRINOLOGY | Facility: CLINIC | Age: 58
End: 2023-01-11

## 2023-01-13 ENCOUNTER — OFFICE VISIT (OUTPATIENT)
Dept: ENDOCRINOLOGY | Facility: CLINIC | Age: 58
End: 2023-01-13

## 2023-01-13 VITALS
WEIGHT: 124.25 LBS | HEART RATE: 66 BPM | HEIGHT: 63 IN | SYSTOLIC BLOOD PRESSURE: 120 MMHG | OXYGEN SATURATION: 89 % | DIASTOLIC BLOOD PRESSURE: 60 MMHG | BODY MASS INDEX: 22.02 KG/M2

## 2023-01-13 DIAGNOSIS — E04.1 THYROID NODULE: ICD-10-CM

## 2023-01-13 DIAGNOSIS — E06.9 THYROIDITIS: Primary | ICD-10-CM

## 2023-01-13 PROCEDURE — 99999 PR PBB SHADOW E&M-EST. PATIENT-LVL III: CPT | Mod: PBBFAC,,, | Performed by: GENERAL ACUTE CARE HOSPITAL

## 2023-01-13 PROCEDURE — 99214 PR OFFICE/OUTPT VISIT, EST, LEVL IV, 30-39 MIN: ICD-10-PCS | Mod: S$PBB,,, | Performed by: GENERAL ACUTE CARE HOSPITAL

## 2023-01-13 PROCEDURE — 99214 OFFICE O/P EST MOD 30 MIN: CPT | Mod: S$PBB,,, | Performed by: GENERAL ACUTE CARE HOSPITAL

## 2023-01-13 PROCEDURE — 99999 PR PBB SHADOW E&M-EST. PATIENT-LVL III: ICD-10-PCS | Mod: PBBFAC,,, | Performed by: GENERAL ACUTE CARE HOSPITAL

## 2023-01-13 PROCEDURE — 99213 OFFICE O/P EST LOW 20 MIN: CPT | Mod: PBBFAC | Performed by: GENERAL ACUTE CARE HOSPITAL

## 2023-01-27 ENCOUNTER — PATIENT MESSAGE (OUTPATIENT)
Dept: INTERNAL MEDICINE | Facility: CLINIC | Age: 58
End: 2023-01-27

## 2023-02-17 ENCOUNTER — LAB VISIT (OUTPATIENT)
Dept: LAB | Facility: HOSPITAL | Age: 58
End: 2023-02-17
Attending: GENERAL ACUTE CARE HOSPITAL

## 2023-02-17 DIAGNOSIS — E06.9 THYROIDITIS: ICD-10-CM

## 2023-02-17 LAB — TSH SERPL DL<=0.005 MIU/L-ACNC: 3.54 UIU/ML (ref 0.4–4)

## 2023-02-17 PROCEDURE — 36415 COLL VENOUS BLD VENIPUNCTURE: CPT | Performed by: GENERAL ACUTE CARE HOSPITAL

## 2023-02-17 PROCEDURE — 84443 ASSAY THYROID STIM HORMONE: CPT | Performed by: GENERAL ACUTE CARE HOSPITAL

## 2023-03-24 ENCOUNTER — HOSPITAL ENCOUNTER (OUTPATIENT)
Dept: ENDOCRINOLOGY | Facility: CLINIC | Age: 58
Discharge: HOME | End: 2023-03-24
Attending: GENERAL ACUTE CARE HOSPITAL

## 2023-03-24 DIAGNOSIS — E06.9 THYROIDITIS: ICD-10-CM

## 2023-03-24 DIAGNOSIS — E04.1 THYROID NODULE: ICD-10-CM

## 2023-03-24 PROCEDURE — 76536 US EXAM OF HEAD AND NECK: CPT | Mod: ,,, | Performed by: INTERNAL MEDICINE

## 2023-03-24 PROCEDURE — 76536 US SOFT TISSUE HEAD NECK THYROID: ICD-10-PCS | Mod: ,,, | Performed by: INTERNAL MEDICINE

## 2023-09-14 NOTE — PROGRESS NOTES
Subjective:      Patient ID: Jennifer Castillo is a 57 y.o.    Chief Complaint:  Subacute thyroiditis; Follow up visit     There are no diagnoses linked to this encounter.      History of Present Illness  58 YO Female that presents to the endocrine clinic for follow up evaluation of subacute thyroiditis.  Pt today reports feels significant improvement of hyperthyroid symptoms since last visit.  Denies hypothyroid symptoms.        Interval history:     Pt was last seen on 9/2022    Since last visit hyperthyroid phase has resolved, BB have been DC and now in mild hypothyroid phase currently monitoring         With regards to subacute thyroiditis and Thyroid Nodule:          Latest Reference Range & Units 09/20/22 14:30 10/07/22 10:00 11/01/22 15:45 11/23/22 14:30 01/06/23 15:10   TSH 0.400 - 4.000 uIU/mL <0.010 (L) 0.044 (L) 19.580 (H) 6.792 (H) 6.929 (H)   T3, Total 60 - 180 ng/dL 248 (H)       Free T4 0.71 - 1.51 ng/dL 3.26 (H) 0.73 0.58 (L) 0.76 0.90   Thyrotropin Receptor Ab 0.00 - 1.75 IU/L 1.22       Thyroid-Stim IG Quantitative <0.10 IU/L <0.10       (L): Data is abnormally low  (H): Data is abnormally high      Subacute thyroiditis now in mild hypothyroid phase and currently monitoring.  (IMPROVING)       Clinically Euthyroid         Thyroid US 9/2022:    FINDINGS:  Right thyroid lobe measures 5.3 x 1.5 x 1.7 cm.  Diffusely heterogeneous parenchyma.  There is a solid, hypoechoic nodule measuring 2.0 cm, TR 4.     Left thyroid lobe measures 6.3 x 2.1 x 2.1 cm.  Diffusely heterogeneous parenchyma.     Isthmus measures 0.4 cm.     There is diffuse hyperemia.     No evidence for cervical lymphadenopathy.     Impression:     1. Diffusely heterogeneous thyroid parenchyma with hyperemia, which may be seen with thyroiditis.  2. Dominant right thyroid nodule meets criteria for fine-needle aspiration.      Unsure if thyroid nodule is a real or pseudo nodule from thyroiditis           -Symptoms?   Tremors, palpitations,  Neck  swelling,  Neck tenderness, Weight loss, heat intolerance, decreased appetite, anxiety, Diarrhea.   -CAD, Arrhythmias?  DENIES   -Eye disease?  DENIES   -Recent IV contrast or Steroids?  DENIES   -Medications?  Amiodarone,  Lasix,  Lt4, Steroids ?   DENIES         -Family history of Thyroid disease?  DENIES     -Compressive symptoms?  DENIES     -Radiation? NO     -Family history of Thyroid CA?  NO    ROS:   As above    Objective:     There were no vitals taken for this visit.    There is no height or weight on file to calculate BMI.      Physical Exam  Vitals reviewed.   Constitutional:       General: She is not in acute distress.     Appearance: Normal appearance. She is well-developed. She is not ill-appearing.   HENT:      Nose: Nose normal. No rhinorrhea.      Mouth/Throat:      Mouth: Mucous membranes are moist.   Eyes:      Extraocular Movements: Extraocular movements intact.      Pupils: Pupils are equal, round, and reactive to light.      Comments: No proptosis, No lid lag, No conjunctival erythema    Neck:      Thyroid: No thyromegaly.      Trachea: No tracheal deviation.      Comments: Thyromegaly +   Cardiovascular:      Rate and Rhythm: Normal rate and regular rhythm.      Pulses: Normal pulses.   Pulmonary:      Effort: Pulmonary effort is normal.      Breath sounds: Normal breath sounds.   Abdominal:      Palpations: Abdomen is soft. There is no mass.      Tenderness: There is no abdominal tenderness.      Hernia: No hernia is present.   Musculoskeletal:         General: No swelling.      Cervical back: Neck supple. No tenderness.      Right lower leg: No edema.      Left lower leg: No edema.   Skin:     General: Skin is warm.      Findings: No rash.   Neurological:      General: No focal deficit present.      Mental Status: She is alert and oriented to person, place, and time.   Psychiatric:         Mood and Affect: Mood normal.         Judgment: Judgment normal.              Lab Review:   No results  found for: HGBA1C  No results found for: CHOL, HDL, LDLCALC, TRIG, CHOLHDL  Lab Results   Component Value Date     09/20/2022    K 3.4 (L) 09/20/2022     09/20/2022    CO2 26 09/20/2022     (H) 09/20/2022    BUN 10 09/20/2022    CREATININE 0.6 09/20/2022    CALCIUM 9.7 09/20/2022    PROT 7.6 09/20/2022    ALBUMIN 2.9 (L) 09/20/2022    BILITOT 0.9 09/20/2022    ALKPHOS 133 09/20/2022    AST 23 09/20/2022    ALT 52 (H) 09/20/2022    ANIONGAP 11 09/20/2022    TSH 6.929 (H) 01/06/2023     No results found for: [id number]    Assessment and Plan     Hyperthyroidism  Subacute thyroiditis     Now in resolution phase still with mild hypothyroidism     Clinically Euthyroid     Will continue to monitor with TSH in 6 weeks       Thyroid nodule    Unsure if thyroid nodule is a real or pseudo nodule from thyroiditis   Will repeat THYROID US inhouse     Indications for FNA discussed                    No

## 2023-11-14 ENCOUNTER — PATIENT MESSAGE (OUTPATIENT)
Dept: INTERNAL MEDICINE | Facility: CLINIC | Age: 58
End: 2023-11-14

## 2023-11-14 DIAGNOSIS — E07.9 THYROID DISEASE: ICD-10-CM

## 2023-11-14 DIAGNOSIS — E05.90 HYPERTHYROIDISM: Primary | ICD-10-CM

## 2023-11-14 DIAGNOSIS — D50.9 IRON DEFICIENCY ANEMIA, UNSPECIFIED IRON DEFICIENCY ANEMIA TYPE: ICD-10-CM

## 2023-11-18 ENCOUNTER — LAB VISIT (OUTPATIENT)
Dept: LAB | Facility: HOSPITAL | Age: 58
End: 2023-11-18

## 2023-11-18 DIAGNOSIS — D50.9 IRON DEFICIENCY ANEMIA, UNSPECIFIED IRON DEFICIENCY ANEMIA TYPE: ICD-10-CM

## 2023-11-18 DIAGNOSIS — E07.9 THYROID DISEASE: ICD-10-CM

## 2023-11-18 DIAGNOSIS — E05.90 HYPERTHYROIDISM: ICD-10-CM

## 2023-11-18 LAB
ALBUMIN SERPL BCP-MCNC: 4.2 G/DL (ref 3.5–5.2)
ALP SERPL-CCNC: 87 U/L (ref 55–135)
ALT SERPL W/O P-5'-P-CCNC: 14 U/L (ref 10–44)
ANION GAP SERPL CALC-SCNC: 10 MMOL/L (ref 8–16)
AST SERPL-CCNC: 22 U/L (ref 10–40)
BASOPHILS # BLD AUTO: 0.04 K/UL (ref 0–0.2)
BASOPHILS NFR BLD: 0.6 % (ref 0–1.9)
BILIRUB SERPL-MCNC: 0.9 MG/DL (ref 0.1–1)
BUN SERPL-MCNC: 10 MG/DL (ref 6–20)
CALCIUM SERPL-MCNC: 9.4 MG/DL (ref 8.7–10.5)
CHLORIDE SERPL-SCNC: 104 MMOL/L (ref 95–110)
CHOLEST SERPL-MCNC: 161 MG/DL (ref 120–199)
CHOLEST/HDLC SERPL: 3.5 {RATIO} (ref 2–5)
CO2 SERPL-SCNC: 26 MMOL/L (ref 23–29)
CREAT SERPL-MCNC: 0.7 MG/DL (ref 0.5–1.4)
DIFFERENTIAL METHOD: ABNORMAL
EOSINOPHIL # BLD AUTO: 0.1 K/UL (ref 0–0.5)
EOSINOPHIL NFR BLD: 0.9 % (ref 0–8)
ERYTHROCYTE [DISTWIDTH] IN BLOOD BY AUTOMATED COUNT: 13.7 % (ref 11.5–14.5)
EST. GFR  (NO RACE VARIABLE): >60 ML/MIN/1.73 M^2
ESTIMATED AVG GLUCOSE: 77 MG/DL (ref 68–131)
GLUCOSE SERPL-MCNC: 94 MG/DL (ref 70–110)
HBA1C MFR BLD: 4.3 % (ref 4–5.6)
HCT VFR BLD AUTO: 39.5 % (ref 37–48.5)
HDLC SERPL-MCNC: 46 MG/DL (ref 40–75)
HDLC SERPL: 28.6 % (ref 20–50)
HGB BLD-MCNC: 13.3 G/DL (ref 12–16)
IMM GRANULOCYTES # BLD AUTO: 0.01 K/UL (ref 0–0.04)
IMM GRANULOCYTES NFR BLD AUTO: 0.2 % (ref 0–0.5)
LDLC SERPL CALC-MCNC: 95.8 MG/DL (ref 63–159)
LYMPHOCYTES # BLD AUTO: 2 K/UL (ref 1–4.8)
LYMPHOCYTES NFR BLD: 31 % (ref 18–48)
MCH RBC QN AUTO: 31.1 PG (ref 27–31)
MCHC RBC AUTO-ENTMCNC: 33.7 G/DL (ref 32–36)
MCV RBC AUTO: 92 FL (ref 82–98)
MONOCYTES # BLD AUTO: 0.4 K/UL (ref 0.3–1)
MONOCYTES NFR BLD: 5.3 % (ref 4–15)
NEUTROPHILS # BLD AUTO: 4.1 K/UL (ref 1.8–7.7)
NEUTROPHILS NFR BLD: 62 % (ref 38–73)
NONHDLC SERPL-MCNC: 115 MG/DL
NRBC BLD-RTO: 0 /100 WBC
PLATELET # BLD AUTO: 189 K/UL (ref 150–450)
PMV BLD AUTO: 11.7 FL (ref 9.2–12.9)
POTASSIUM SERPL-SCNC: 3.8 MMOL/L (ref 3.5–5.1)
PROT SERPL-MCNC: 7.3 G/DL (ref 6–8.4)
RBC # BLD AUTO: 4.28 M/UL (ref 4–5.4)
SODIUM SERPL-SCNC: 140 MMOL/L (ref 136–145)
T4 FREE SERPL-MCNC: 0.93 NG/DL (ref 0.71–1.51)
TRIGL SERPL-MCNC: 96 MG/DL (ref 30–150)
TSH SERPL DL<=0.005 MIU/L-ACNC: 4.07 UIU/ML (ref 0.4–4)
WBC # BLD AUTO: 6.55 K/UL (ref 3.9–12.7)

## 2023-11-18 PROCEDURE — 85025 COMPLETE CBC W/AUTO DIFF WBC: CPT | Performed by: NURSE PRACTITIONER

## 2023-11-18 PROCEDURE — 80053 COMPREHEN METABOLIC PANEL: CPT | Performed by: NURSE PRACTITIONER

## 2023-11-18 PROCEDURE — 36415 COLL VENOUS BLD VENIPUNCTURE: CPT | Performed by: NURSE PRACTITIONER

## 2023-11-18 PROCEDURE — 84439 ASSAY OF FREE THYROXINE: CPT | Performed by: NURSE PRACTITIONER

## 2023-11-18 PROCEDURE — 80061 LIPID PANEL: CPT | Performed by: NURSE PRACTITIONER

## 2023-11-18 PROCEDURE — 83036 HEMOGLOBIN GLYCOSYLATED A1C: CPT | Performed by: NURSE PRACTITIONER

## 2023-11-18 PROCEDURE — 84443 ASSAY THYROID STIM HORMONE: CPT | Performed by: NURSE PRACTITIONER

## 2023-12-03 ENCOUNTER — PATIENT MESSAGE (OUTPATIENT)
Dept: INTERNAL MEDICINE | Facility: CLINIC | Age: 58
End: 2023-12-03

## 2023-12-14 ENCOUNTER — TELEPHONE (OUTPATIENT)
Dept: INTERNAL MEDICINE | Facility: CLINIC | Age: 58
End: 2023-12-14

## 2023-12-14 ENCOUNTER — OFFICE VISIT (OUTPATIENT)
Dept: INTERNAL MEDICINE | Facility: CLINIC | Age: 58
End: 2023-12-14

## 2023-12-14 VITALS
BODY MASS INDEX: 23.04 KG/M2 | DIASTOLIC BLOOD PRESSURE: 80 MMHG | HEIGHT: 63 IN | HEART RATE: 72 BPM | SYSTOLIC BLOOD PRESSURE: 130 MMHG | OXYGEN SATURATION: 98 % | WEIGHT: 130.06 LBS

## 2023-12-14 DIAGNOSIS — Z12.31 SCREENING MAMMOGRAM, ENCOUNTER FOR: ICD-10-CM

## 2023-12-14 DIAGNOSIS — Z12.11 ENCOUNTER FOR COLORECTAL CANCER SCREENING: ICD-10-CM

## 2023-12-14 DIAGNOSIS — Z00.00 ANNUAL PHYSICAL EXAM: Primary | ICD-10-CM

## 2023-12-14 DIAGNOSIS — L72.3 SEBACEOUS CYST: ICD-10-CM

## 2023-12-14 DIAGNOSIS — E07.9 THYROID DISEASE: ICD-10-CM

## 2023-12-14 DIAGNOSIS — Z12.12 ENCOUNTER FOR COLORECTAL CANCER SCREENING: ICD-10-CM

## 2023-12-14 PROCEDURE — 99396 PREV VISIT EST AGE 40-64: CPT | Mod: S$PBB,,, | Performed by: NURSE PRACTITIONER

## 2023-12-14 PROCEDURE — 99999 PR PBB SHADOW E&M-EST. PATIENT-LVL IV: ICD-10-PCS | Mod: PBBFAC,,, | Performed by: NURSE PRACTITIONER

## 2023-12-14 PROCEDURE — 99999 PR PBB SHADOW E&M-EST. PATIENT-LVL IV: CPT | Mod: PBBFAC,,, | Performed by: NURSE PRACTITIONER

## 2023-12-14 PROCEDURE — 99214 OFFICE O/P EST MOD 30 MIN: CPT | Mod: PBBFAC | Performed by: NURSE PRACTITIONER

## 2023-12-14 PROCEDURE — 99396 PR PREVENTIVE VISIT,EST,40-64: ICD-10-PCS | Mod: S$PBB,,, | Performed by: NURSE PRACTITIONER

## 2023-12-14 RX ORDER — CHOLECALCIFEROL (VITAMIN D3) 25 MCG
1000 TABLET ORAL DAILY
COMMUNITY

## 2023-12-14 RX ORDER — MUPIROCIN 20 MG/G
OINTMENT TOPICAL 3 TIMES DAILY
Qty: 30 G | Refills: 0 | Status: SHIPPED | OUTPATIENT
Start: 2023-12-14

## 2023-12-14 NOTE — PROGRESS NOTES
Internal Medicine Annual Exam       CHIEF COMPLAINT     The patient, Jennifer Castillo, who is a 58 y.o. female presents for an annual exam.    HPI   Here for annual     Thyroiditis- followed by endocrinology - last seen 1/13/2023  Hyperthyroidism-   Lab Results   Component Value Date    TSH 4.070 (H) 11/18/2023   No longer taking BB   Not taking synthroid     Anemia- resolved.     HM-  PAP- normal utd   MMG-10/15/2022 - at [facility]   [facility]- will send Fit kit   Flu- utd             Past Medical History:   Diagnosis Date    Thyroid disease        Past Medical History:  Past Medical History:   Diagnosis Date    Thyroid disease        History reviewed. No pertinent surgical history.     Family History   Problem Relation Age of Onset    Stomach cancer Mother     Hypertension Father     No Known Problems Brother     No Known Problems Brother     No Known Problems Brother     Liver cancer Maternal Aunt         Social History     Socioeconomic History    Marital status:    Tobacco Use    Smoking status: Never     Passive exposure: Never    Smokeless tobacco: Never   Substance and Sexual Activity    Alcohol use: Yes     Alcohol/week: 1.0 standard drink of alcohol     Types: 1 Cans of beer per week     Comment: socailly    Drug use: Never    Sexual activity: Not Currently     Partners: Male     Birth control/protection: Post-menopausal     Social Determinants of Health     Financial Resource Strain: Low Risk  (12/12/2023)    Overall Financial Resource Strain (CARDIA)     Difficulty of Paying Living Expenses: Not very hard   Food Insecurity: No Food Insecurity (12/12/2023)    Hunger Vital Sign     Worried About Running Out of Food in the Last Year: Never true     Ran Out of Food in the Last Year: Never true   Transportation Needs: No Transportation Needs (12/12/2023)    PRAPARE - Transportation     Lack of Transportation (Medical): No     Lack of Transportation (Non-Medical): No   Physical Activity: Sufficiently Active (12/12/2023)     Exercise Vital Sign     Days of Exercise per Week: 4 days     Minutes of Exercise per Session: 60 min   Stress: No Stress Concern Present (12/12/2023)    [employer] - Occupational Stress Questionnaire     Feeling of Stress : Only a little   Social Connections: Unknown (12/12/2023)    Social Connection and Isolation Panel [NHANES]     Frequency of Communication with Friends and Family: Three times a week     Frequency of Social Gatherings with Friends and Family: Twice a week     Active Member of Clubs or Organizations: No     Attends Club or Organization Meetings: Never     Marital Status:    Housing Stability: Low Risk  (12/12/2023)    Housing Stability Vital Sign     Unable to Pay for Housing in the Last Year: No     Number of Places Lived in the Last Year: 1     Unstable Housing in the Last Year: No        Social History     Tobacco Use   Smoking Status Never    Passive exposure: Never   Smokeless Tobacco Never        Allergies as of 12/14/2023    (No Known Allergies)          Home Medications:  Prior to Admission medications    Medication Sig Start Date End Date Taking? Authorizing Provider   calcium-vitamin D3 (OS-VERNA 500 + D3) 500 mg-5 mcg (200 unit) per tablet Take 1 tablet by mouth 2 (two) times daily with meals.   Yes Provider, Historical   omega-3 fatty acids/fish oil (FISH OIL-OMEGA-3 FATTY ACIDS) 300-1,000 mg capsule Take by mouth once daily.   Yes Provider, Historical   vitamin D (VITAMIN D3) 1000 units Tab Take 1,000 Units by mouth once daily.   Yes Provider, Historical   diphenhydrAMINE-aluminum-magnesium hydroxide-simethicone-LIDOcaine HCl 2% Swish and spit 10 mLs every 4 (four) hours as needed (sore throat). 9/8/22   Leonard Gagnon MD   magnesium 30 mg Tab Take by mouth once.    Provider, Historical   pantoprazole (PROTONIX) 20 MG tablet Take 1 tablet (20 mg total) by mouth once daily. 9/19/22 9/19/23  Dannie Saleem MD   propranoloL (INDERAL) 20 MG  "tablet Take 1 tablet (20 mg total) by mouth 3 (three) times daily. 9/13/22 9/13/23  Andrzejus-Maxine Shin NP       Review of Systems:  Review of Systems   Constitutional:  Negative for activity change and unexpected weight change.   HENT:  Negative for hearing loss, rhinorrhea and trouble swallowing.    Eyes:  Negative for discharge and visual disturbance.   Respiratory:  Negative for chest tightness and wheezing.    Cardiovascular:  Negative for chest pain and palpitations.   Gastrointestinal:  Negative for blood in stool, constipation, diarrhea and vomiting.   Endocrine: Negative for polydipsia and polyuria.   Genitourinary:  Negative for difficulty urinating, dysuria, hematuria and menstrual problem.   Musculoskeletal:  Negative for arthralgias, joint swelling and neck pain.   Neurological:  Negative for weakness and headaches.   Psychiatric/Behavioral:  Negative for confusion and dysphoric mood.        Health Maintainence:   Immunizations:  Health Maintenance         Date Due Completion Date    Hepatitis C Screening Never done ---    Cervical Cancer Screening Never done ---    HIV Screening Never done ---    TETANUS VACCINE Never done ---    Mammogram Never done ---    Colorectal Cancer Screening Never done ---    Shingles Vaccine (1 of 2) Never done ---    COVID-19 Vaccine (5 - 2023-24 season) 09/01/2023 10/15/2022    Lipid Panel 11/18/2028 11/18/2023             PHYSICAL EXAM     /80 (BP Location: Right arm, Patient Position: Sitting, BP Method: Medium (Manual))   Pulse 72   Ht 5' 3" (1.6 m)   Wt 59 kg (130 lb 1.1 oz)   SpO2 98%   BMI 23.04 kg/m²  Body mass index is 23.04 kg/m².    Physical Exam  Vitals reviewed.   Constitutional:       Appearance: She is well-developed.   HENT:      Head: Normocephalic.      Right Ear: External ear normal.      Left Ear: External ear normal.      Nose: Nose normal.      Mouth/Throat:      Pharynx: No oropharyngeal exudate.   Eyes:      Pupils: Pupils are equal, " round, and reactive to light.   Neck:      Thyroid: No thyromegaly.      Vascular: No JVD.      Trachea: No tracheal deviation.   Cardiovascular:      Rate and Rhythm: Normal rate and regular rhythm.      Heart sounds: Normal heart sounds. No murmur heard.     No friction rub. No gallop.   Pulmonary:      Effort: Pulmonary effort is normal. No respiratory distress.      Breath sounds: Normal breath sounds. No wheezing or rales.   Abdominal:      General: Bowel sounds are normal. There is no distension.      Palpations: Abdomen is soft.      Tenderness: There is no abdominal tenderness.   Musculoskeletal:         General: No tenderness. Normal range of motion.      Cervical back: Neck supple.   Lymphadenopathy:      Cervical: No cervical adenopathy.   Skin:     General: Skin is warm and dry.      Findings: No rash.             Comments: Sebaceous cyst - ruptured and draining    Neurological:      Mental Status: She is alert and oriented to person, place, and time.   Psychiatric:         Behavior: Behavior normal.         LABS     Lab Results   Component Value Date    HGBA1C 4.3 11/18/2023     CMP  Sodium   Date Value Ref Range Status   11/18/2023 140 136 - 145 mmol/L Final     Potassium   Date Value Ref Range Status   11/18/2023 3.8 3.5 - 5.1 mmol/L Final     Chloride   Date Value Ref Range Status   11/18/2023 104 95 - 110 mmol/L Final     CO2   Date Value Ref Range Status   11/18/2023 26 23 - 29 mmol/L Final     Glucose   Date Value Ref Range Status   11/18/2023 94 70 - 110 mg/dL Final     BUN   Date Value Ref Range Status   11/18/2023 10 6 - 20 mg/dL Final     Creatinine   Date Value Ref Range Status   11/18/2023 0.7 0.5 - 1.4 mg/dL Final     Calcium   Date Value Ref Range Status   11/18/2023 9.4 8.7 - 10.5 mg/dL Final     Total Protein   Date Value Ref Range Status   11/18/2023 7.3 6.0 - 8.4 g/dL Final     Albumin   Date Value Ref Range Status   11/18/2023 4.2 3.5 - 5.2 g/dL Final     Total Bilirubin   Date Value  Ref Range Status   11/18/2023 0.9 0.1 - 1.0 mg/dL Final     Comment:     For infants and newborns, interpretation of results should be based  on gestational age, weight and in agreement with clinical  observations.    Premature Infant recommended reference ranges:  Up to 24 hours.............<8.0 mg/dL  Up to 48 hours............<12.0 mg/dL  3-5 days..................<15.0 mg/dL  6-29 days.................<15.0 mg/dL       Alkaline Phosphatase   Date Value Ref Range Status   11/18/2023 87 55 - 135 U/L Final     AST   Date Value Ref Range Status   11/18/2023 22 10 - 40 U/L Final     ALT   Date Value Ref Range Status   11/18/2023 14 10 - 44 U/L Final     Anion Gap   Date Value Ref Range Status   11/18/2023 10 8 - 16 mmol/L Final     Lab Results   Component Value Date    WBC 6.55 11/18/2023    HGB 13.3 11/18/2023    HCT 39.5 11/18/2023    MCV 92 11/18/2023     11/18/2023     Lab Results   Component Value Date    CHOL 161 11/18/2023     Lab Results   Component Value Date    HDL 46 11/18/2023     Lab Results   Component Value Date    LDLCALC 95.8 11/18/2023     Lab Results   Component Value Date    TRIG 96 11/18/2023     Lab Results   Component Value Date    CHOLHDL 28.6 11/18/2023     Lab Results   Component Value Date    TSH 4.070 (H) 11/18/2023    [id number] 248 (H) 09/20/2022       ASSESSMENT/PLAN     Jennifer Castillo is a 58 y.o. female    Annual physical exam- All age and gender related screenings discussed     Screening mammogram, encounter for  -     Mammo Digital Screening Bilat w/ Klever; Future; Expected date: 12/14/2023    Encounter for colorectal cancer screening  -     Fecal Immunochemical Test (iFOBT); Future; Expected date: 12/14/2023    Thyroid disease- clinically euthyroid. Will monitor TFT in 6 months   -     TSH; Future; Expected date: 12/14/2023  -     T4, Free; Future; Expected date: 12/14/2023    Sebaceous cyst- stable. Will wash with soap and water. Apply mupirocin and bandage   -     mupirocin  (BACTROBAN) 2 % ointment; Apply topically 3 (three) times daily.  Dispense: 30 g; Refill: 0           Follow up with PCP     Maxine CLANCY, JULIET, FNP-c   Department of Internal Medicine - [facility]  3:18 PM

## 2023-12-19 ENCOUNTER — LAB VISIT (OUTPATIENT)
Dept: LAB | Facility: HOSPITAL | Age: 58
End: 2023-12-19

## 2023-12-19 DIAGNOSIS — Z12.11 ENCOUNTER FOR COLORECTAL CANCER SCREENING: ICD-10-CM

## 2023-12-19 DIAGNOSIS — Z12.12 ENCOUNTER FOR COLORECTAL CANCER SCREENING: ICD-10-CM

## 2023-12-19 LAB — HEMOCCULT STL QL IA: NEGATIVE

## 2023-12-19 PROCEDURE — 82274 ASSAY TEST FOR BLOOD FECAL: CPT | Performed by: NURSE PRACTITIONER

## 2024-01-26 ENCOUNTER — PATIENT MESSAGE (OUTPATIENT)
Dept: INTERNAL MEDICINE | Facility: CLINIC | Age: 59
End: 2024-01-26

## 2024-03-06 ENCOUNTER — HOSPITAL ENCOUNTER (OUTPATIENT)
Dept: RADIOLOGY | Facility: HOSPITAL | Age: 59
Discharge: HOME | End: 2024-03-06

## 2024-03-06 DIAGNOSIS — Z12.31 ENCOUNTER FOR SCREENING MAMMOGRAM FOR BREAST CANCER: ICD-10-CM

## 2024-03-06 PROCEDURE — 77063 BREAST TOMOSYNTHESIS BI: CPT | Mod: 26,,, | Performed by: RADIOLOGY

## 2024-03-06 PROCEDURE — 77067 SCR MAMMO BI INCL CAD: CPT | Mod: TC

## 2024-03-06 PROCEDURE — 77067 SCR MAMMO BI INCL CAD: CPT | Mod: 26,,, | Performed by: RADIOLOGY

## 2024-06-14 ENCOUNTER — LAB VISIT (OUTPATIENT)
Dept: LAB | Facility: HOSPITAL | Age: 59
End: 2024-06-14

## 2024-06-14 DIAGNOSIS — E07.9 THYROID DISEASE: ICD-10-CM

## 2024-06-14 LAB
T4 FREE SERPL-MCNC: 0.94 NG/DL (ref 0.71–1.51)
TSH SERPL DL<=0.005 MIU/L-ACNC: 3.68 UIU/ML (ref 0.4–4)

## 2024-06-14 PROCEDURE — 36415 COLL VENOUS BLD VENIPUNCTURE: CPT | Performed by: NURSE PRACTITIONER

## 2024-06-14 PROCEDURE — 84439 ASSAY OF FREE THYROXINE: CPT | Performed by: NURSE PRACTITIONER

## 2024-06-14 PROCEDURE — 84443 ASSAY THYROID STIM HORMONE: CPT | Performed by: NURSE PRACTITIONER
